# Patient Record
(demographics unavailable — no encounter records)

---

## 2024-10-08 NOTE — COUNSELING
[Behavioral health counseling provided] : Behavioral health counseling provided [Sleep ___ hours/day] : Sleep [unfilled] hours/day [Engage in a relaxing activity] : Engage in a relaxing activity [Plan in advance] : Plan in advance [de-identified] : Healthy Lifestyle  [Other: ____] : [unfilled]

## 2024-10-08 NOTE — COUNSELING
[Behavioral health counseling provided] : Behavioral health counseling provided [Sleep ___ hours/day] : Sleep [unfilled] hours/day [Engage in a relaxing activity] : Engage in a relaxing activity [Plan in advance] : Plan in advance [de-identified] : Healthy Lifestyle  [Other: ____] : [unfilled]

## 2024-10-08 NOTE — HISTORY OF PRESENT ILLNESS
[FreeTextEntry1] : F/U Mult medical conditions  [de-identified] : 59 yr old presents for above, feels well overall. Struggling with anxiety and depression. No rj as before.

## 2024-10-08 NOTE — HEALTH RISK ASSESSMENT
[No] : In the past 12 months have you used drugs other than those required for medical reasons? No [No falls in past year] : Patient reported no falls in the past year [0] : 2) Feeling down, depressed, or hopeless: Not at all (0) [de-identified] : No [de-identified] : No [de-identified] : Walking, active with daily activities  [de-identified] : Regular  [de-identified] : No

## 2024-10-08 NOTE — HISTORY OF PRESENT ILLNESS
[FreeTextEntry1] : F/U Mult medical conditions  [de-identified] : 59 yr old presents for above, feels well overall. Struggling with anxiety and depression. No rj as before.

## 2024-10-08 NOTE — HEALTH RISK ASSESSMENT
[No] : In the past 12 months have you used drugs other than those required for medical reasons? No [No falls in past year] : Patient reported no falls in the past year [0] : 2) Feeling down, depressed, or hopeless: Not at all (0) [de-identified] : No [de-identified] : No [de-identified] : Walking, active with daily activities  [de-identified] : Regular  [de-identified] : No

## 2024-10-08 NOTE — PHYSICAL EXAM
[No Acute Distress] : no acute distress [No Edema] : there was no peripheral edema [Soft] : abdomen soft [No CVA Tenderness] : no CVA  tenderness [No Joint Swelling] : no joint swelling [No Rash] : no rash [Coordination Grossly Intact] : coordination grossly intact [Speech Grossly Normal] : speech grossly normal [Memory Grossly Normal] : memory grossly normal [Normal Affect] : the affect was normal [Alert and Oriented x3] : oriented to person, place, and time [Normal Mood] : the mood was normal [Normal Insight/Judgement] : insight and judgment were intact [Normal] : no jugular venous distention, supple, no lymphadenopathy and the thyroid was normal and there were no nodules present

## 2025-03-19 NOTE — HISTORY OF PRESENT ILLNESS
[FreeTextEntry1] : F/U Mult medical conditions  [de-identified] : 59 yr old presents today, +Crying loss of wife, Dies suddenly Lung CA ( 54 yrs old) Reports loss of Becki in his life, would like to sleep all day, but realizes he can not do that, Only thing keeps him going is his children and his [place]. Weight gain noted . Sees Pain management for local injections to neck/  Spine, 911 ,

## 2025-03-19 NOTE — HEALTH RISK ASSESSMENT
[Yes] : Yes [Monthly or less (1 pt)] : Monthly or less (1 point) [1 or 2 (0 pts)] : 1 or 2 (0 points) [Never (0 pts)] : Never (0 points) [No] : In the past 12 months have you used drugs other than those required for medical reasons? No [No falls in past year] : Patient reported no falls in the past year [1] : 2) Feeling down, depressed, or hopeless for several days (1) [de-identified] : No [de-identified] : Pain Management  [Audit-CScore] : 1 [de-identified] : Daily activities  Daily activies  [de-identified] : Regular  [[id number]] : 2 [de-identified] : No

## 2025-03-19 NOTE — PLAN
[FreeTextEntry1] : 59 yr old F/U  HTN- Controlled, Low salt diet meds daily  Depression- Start Wellbutrin 150mg po  daily , Discussed risks/ nBenefits of medication Discussed in detail need to go for Counseling Given name/ number local Therapist/ Terrie garduno Pt agreed to pay  cash if needed Pt will cancel Therapist he  has with [facility]  Re-newed all meds   Discussed [facility]/ [place] [facility]- in [facility] if Depression Sadness becomes overwhelming  Call office and ask for Provider any questions  F/U within 1 month Re-assess, Pt agreed

## 2025-03-19 NOTE — PHYSICAL EXAM
[No Acute Distress] : no acute distress [No JVD] : no jugular venous distention [No Respiratory Distress] : no respiratory distress  [No Accessory Muscle Use] : no accessory muscle use [Clear to Auscultation] : lungs were clear to auscultation bilaterally [Normal Rate] : normal rate  [Regular Rhythm] : with a regular rhythm [Normal S1, S2] : normal S1 and S2 [No Edema] : there was no peripheral edema [Soft] : abdomen soft [Normal Anterior Cervical Nodes] : no anterior cervical lymphadenopathy [No CVA Tenderness] : no CVA  tenderness [No Spinal Tenderness] : no spinal tenderness [No Joint Swelling] : no joint swelling [No Rash] : no rash [Coordination Grossly Intact] : coordination grossly intact [No Focal Deficits] : no focal deficits [Normal Gait] : normal gait [Speech Grossly Normal] : speech grossly normal [Memory Grossly Normal] : memory grossly normal [Alert and Oriented x3] : oriented to person, place, and time [Normal Insight/Judgement] : insight and judgment were intact [de-identified] : obese in stature  [de-identified] : +Depressed / Crying

## 2025-03-19 NOTE — COUNSELING
[Behavioral health counseling provided] : Behavioral health counseling provided [Sleep ___ hours/day] : Sleep [unfilled] hours/day [Engage in a relaxing activity] : Engage in a relaxing activity [Plan in advance] : Plan in advance [Other: ____] : [unfilled] [de-identified] : Mental Health Counseling

## 2025-03-19 NOTE — HEALTH RISK ASSESSMENT
[Yes] : Yes [Monthly or less (1 pt)] : Monthly or less (1 point) [1 or 2 (0 pts)] : 1 or 2 (0 points) [Never (0 pts)] : Never (0 points) [No] : In the past 12 months have you used drugs other than those required for medical reasons? No [No falls in past year] : Patient reported no falls in the past year [1] : 2) Feeling down, depressed, or hopeless for several days (1) [de-identified] : No [de-identified] : Pain Management  [Audit-CScore] : 1 [de-identified] : Daily activities  Daily activies  [de-identified] : Regular  [[id number]] : 2 [de-identified] : No

## 2025-03-19 NOTE — REVIEW OF SYSTEMS
[Fatigue] : fatigue [Joint Pain] : joint pain [Back Pain] : back pain [Anxiety] : anxiety [Depression] : depression [Fever] : no fever [Chills] : no chills [Vision Problems] : no vision problems [Sore Throat] : no sore throat [Chest Pain] : no chest pain [Palpitations] : no palpitations [Shortness Of Breath] : no shortness of breath [Wheezing] : no wheezing [Cough] : no cough [Abdominal Pain] : no abdominal pain [Hematuria] : no hematuria [Skin Rash] : no skin rash [Headache] : no headache [Memory Loss] : no memory loss [Suicidal] : not suicidal [Insomnia] : no insomnia [FreeTextEntry9] : Chronic

## 2025-03-19 NOTE — PHYSICAL EXAM
[No Acute Distress] : no acute distress [No JVD] : no jugular venous distention [No Respiratory Distress] : no respiratory distress  [No Accessory Muscle Use] : no accessory muscle use [Clear to Auscultation] : lungs were clear to auscultation bilaterally [Normal Rate] : normal rate  [Regular Rhythm] : with a regular rhythm [Normal S1, S2] : normal S1 and S2 [No Edema] : there was no peripheral edema [Soft] : abdomen soft [Normal Anterior Cervical Nodes] : no anterior cervical lymphadenopathy [No CVA Tenderness] : no CVA  tenderness [No Spinal Tenderness] : no spinal tenderness [No Joint Swelling] : no joint swelling [No Rash] : no rash [Coordination Grossly Intact] : coordination grossly intact [No Focal Deficits] : no focal deficits [Normal Gait] : normal gait [Speech Grossly Normal] : speech grossly normal [Memory Grossly Normal] : memory grossly normal [Alert and Oriented x3] : oriented to person, place, and time [Normal Insight/Judgement] : insight and judgment were intact [de-identified] : obese in stature  [de-identified] : +Depressed / Crying

## 2025-03-19 NOTE — COUNSELING
[Behavioral health counseling provided] : Behavioral health counseling provided [Sleep ___ hours/day] : Sleep [unfilled] hours/day [Engage in a relaxing activity] : Engage in a relaxing activity [Plan in advance] : Plan in advance [Other: ____] : [unfilled] [de-identified] : Mental Health Counseling

## 2025-03-19 NOTE — HISTORY OF PRESENT ILLNESS
[FreeTextEntry1] : F/U Mult medical conditions  [de-identified] : 59 yr old presents today, +Crying loss of wife, Dies suddenly Lung CA ( 54 yrs old) Reports loss of Becki in his life, would like to sleep all day, but realizes he can not do that, Only thing keeps him going is his children and his [place]. Weight gain noted . Sees Pain management for local injections to neck/  Spine, 911 ,

## 2025-03-19 NOTE — PLAN
[FreeTextEntry1] : 59 yr old F/U  HTN- Controlled, Low salt diet meds daily  Depression- Start Wellbutrin 150mg po  daily , Discussed risks/ nBenefits of medication Discussed in detail need to go for Counseling Given name/ number local Therapist/ Terrie garduno Pt agreed to pay  cash if needed Pt will cancel Therapist he  has with [facility]  Re-newed all meds   Discussed [facility]/ [place] walk- in [facility] if Depression Sadness becomes overwhelming  Call office and ask for Provider any questions  F/U within 1 month Re-assess, Pt agreed

## 2025-04-23 NOTE — HISTORY OF PRESENT ILLNESS
[FreeTextEntry1] : F/U Depression/ Anxiety [de-identified] : Seeing Counselor [facility] in person, weekly sessions Counselor , reaching out to people with similar situation Ex; Loss / Grief. Taking Wellbutrin 150 mg  daily, feels leveled,, according to Pt.  Denies any bothersome side effects, will discuss possible increase in dosage?

## 2025-04-23 NOTE — PHYSICAL EXAM
[No Acute Distress] : no acute distress [Normal Sclera/Conjunctiva] : normal sclera/conjunctiva [No JVD] : no jugular venous distention [No Respiratory Distress] : no respiratory distress  [No Accessory Muscle Use] : no accessory muscle use [Clear to Auscultation] : lungs were clear to auscultation bilaterally [Normal Rate] : normal rate  [Regular Rhythm] : with a regular rhythm [Normal S1, S2] : normal S1 and S2 [No Edema] : there was no peripheral edema [Soft] : abdomen soft [Normal Anterior Cervical Nodes] : no anterior cervical lymphadenopathy [No CVA Tenderness] : no CVA  tenderness [No Joint Swelling] : no joint swelling [No Rash] : no rash [Coordination Grossly Intact] : coordination grossly intact [No Focal Deficits] : no focal deficits [Speech Grossly Normal] : speech grossly normal [Memory Grossly Normal] : memory grossly normal [Normal Affect] : the affect was normal [Alert and Oriented x3] : oriented to person, place, and time [Normal Mood] : the mood was normal [Normal Insight/Judgement] : insight and judgment were intact [de-identified] : Obese in stature  [de-identified] : very talkative, reports feeling more hopeful about the future

## 2025-04-23 NOTE — PLAN
[FreeTextEntry1] : 59 yr old F/U  Reactive Depression-  Continue Wellbutrin 150mg po  daily, Pt declined increase in dosage at this time Continue weekly sessions with Therapist Exercise / Walking helps mental Health also Healthy eating habits   PTSD- 911 Survivor , reports very bad Survivor  guilt after 911,,, never went for Counseling till now Support groups recommended   HTN- Controlled, low salt diet Weight loss/ exercise  F/U 3-4 months, sooner if needed

## 2025-04-23 NOTE — HEALTH RISK ASSESSMENT
[No] : In the past 12 months have you used drugs other than those required for medical reasons? No [No falls in past year] : Patient reported no falls in the past year [0] : 2) Feeling down, depressed, or hopeless: Not at all (0) [Never] : Never [de-identified] : none [de-identified] : Cardiology [Audit-CScore] : 0 [de-identified] : no [de-identified] : regular [[id number]] : 0

## 2025-04-23 NOTE — COUNSELING
[Behavioral health counseling provided] : Behavioral health counseling provided [Sleep ___ hours/day] : Sleep [unfilled] hours/day [Engage in a relaxing activity] : Engage in a relaxing activity [Plan in advance] : Plan in advance [de-identified] : Healthy Lifestyle

## 2025-04-23 NOTE — REVIEW OF SYSTEMS
[Depression] : depression [Negative] : Heme/Lymph [Suicidal] : not suicidal [Insomnia] : no insomnia [Anxiety] : no anxiety [de-identified] : much improved

## 2025-04-23 NOTE — COUNSELING
[Behavioral health counseling provided] : Behavioral health counseling provided [Sleep ___ hours/day] : Sleep [unfilled] hours/day [Engage in a relaxing activity] : Engage in a relaxing activity [Plan in advance] : Plan in advance [de-identified] : Healthy Lifestyle

## 2025-04-23 NOTE — REVIEW OF SYSTEMS
[Depression] : depression [Negative] : Heme/Lymph [Suicidal] : not suicidal [Insomnia] : no insomnia [Anxiety] : no anxiety [de-identified] : much improved

## 2025-04-23 NOTE — HISTORY OF PRESENT ILLNESS
[FreeTextEntry1] : F/U Depression/ Anxiety [de-identified] : Seeing Counselor [facility] in person, weekly sessions Counselor , reaching out to people with similar situation Ex; Loss / Grief. Taking Wellbutrin 150 mg  daily, feels leveled,, according to Pt.  Denies any bothersome side effects, will discuss possible increase in dosage?

## 2025-04-23 NOTE — HEALTH RISK ASSESSMENT
[No] : In the past 12 months have you used drugs other than those required for medical reasons? No [No falls in past year] : Patient reported no falls in the past year [0] : 2) Feeling down, depressed, or hopeless: Not at all (0) [Never] : Never [de-identified] : none [de-identified] : Cardiology [Audit-CScore] : 0 [de-identified] : no [de-identified] : regular [[id number]] : 0

## 2025-04-23 NOTE — PHYSICAL EXAM
[No Acute Distress] : no acute distress [Normal Sclera/Conjunctiva] : normal sclera/conjunctiva [No JVD] : no jugular venous distention [No Respiratory Distress] : no respiratory distress  [No Accessory Muscle Use] : no accessory muscle use [Clear to Auscultation] : lungs were clear to auscultation bilaterally [Normal Rate] : normal rate  [Regular Rhythm] : with a regular rhythm [Normal S1, S2] : normal S1 and S2 [No Edema] : there was no peripheral edema [Soft] : abdomen soft [Normal Anterior Cervical Nodes] : no anterior cervical lymphadenopathy [No CVA Tenderness] : no CVA  tenderness [No Joint Swelling] : no joint swelling [No Rash] : no rash [Coordination Grossly Intact] : coordination grossly intact [No Focal Deficits] : no focal deficits [Speech Grossly Normal] : speech grossly normal [Memory Grossly Normal] : memory grossly normal [Normal Affect] : the affect was normal [Alert and Oriented x3] : oriented to person, place, and time [Normal Mood] : the mood was normal [Normal Insight/Judgement] : insight and judgment were intact [de-identified] : Obese in stature  [de-identified] : very talkative, reports feeling more hopeful about the future